# Patient Record
(demographics unavailable — no encounter records)

---

## 2025-01-09 NOTE — CARDIOLOGY SUMMARY
[de-identified] : 3/13/2023: \par  CONCLUSIONS:\par  1. Mitral annular calcification, otherwise normal mitral\par  valve. Mild-moderate mitral regurgitation.\par  2. Calcified trileaflet aortic valve with normal opening.\par  No aortic valve regurgitation seen.\par  3. Normal aortic root.  Mild non-mobile atherosclerotic\par  plaque in the aortic arch and descending thoracic aorta.\par  4. Normal left atrium.  No left atrium or left atrial\par  appendage thrombus.\par  5. Normal left ventricular systolic function. No segmental\par  wall motion abnormalities.\par  6. Normal right ventricular size and function.\par  7. Contrast injection demonstrates no evidence of a patent\par  foramen ovale.\par  \par  2/2023 Summary:\par   1. Left ventricular ejection fraction, by visual estimation, is 55 to \par  60%.\par   2. Normal global left ventricular systolic function.\par   3. Moderately enlarged left atrium.\par   4. Mild mitral valve regurgitation.\par   5. Mild tricuspid regurgitation.\par   6. Sclerotic aortic valve with normal opening.

## 2025-01-09 NOTE — DISCUSSION/SUMMARY
[FreeTextEntry1] : Impression:  1. Persistent afib: now s/p PVI, PWI, and CTI ablation on 5/25/2023. EKG performed today to assess for presence of recurrent afib and reveals NSR. Recent ECHO with low normal LVEF. Resume Eliquis for thromboembolic prophylaxis.   2. HTN: resume oral antihypertensives as prescribed. Encouraged heart healthy diet, sodium restriction, and weight loss. Continue regular f/u with Cardiologist for further HTN management.  3. HLD: resume regular f/u with Cardiologist for routine lipid monitoring and management.  Continue f/u with Cardiologist and RTO for f/u in 6 months. [EKG obtained to assist in diagnosis and management of assessed problem(s)] : EKG obtained to assist in diagnosis and management of assessed problem(s)